# Patient Record
Sex: FEMALE | Race: WHITE
[De-identification: names, ages, dates, MRNs, and addresses within clinical notes are randomized per-mention and may not be internally consistent; named-entity substitution may affect disease eponyms.]

---

## 2022-12-13 ENCOUNTER — HOSPITAL ENCOUNTER (EMERGENCY)
Dept: HOSPITAL 4 - SED | Age: 77
Discharge: HOME | End: 2022-12-13
Payer: MEDICARE

## 2022-12-13 VITALS — SYSTOLIC BLOOD PRESSURE: 131 MMHG

## 2022-12-13 VITALS — WEIGHT: 140 LBS | HEIGHT: 64 IN | BODY MASS INDEX: 23.9 KG/M2

## 2022-12-13 VITALS — SYSTOLIC BLOOD PRESSURE: 140 MMHG

## 2022-12-13 DIAGNOSIS — E86.0: ICD-10-CM

## 2022-12-13 DIAGNOSIS — R19.7: ICD-10-CM

## 2022-12-13 DIAGNOSIS — R10.33: ICD-10-CM

## 2022-12-13 DIAGNOSIS — A08.4: Primary | ICD-10-CM

## 2022-12-13 DIAGNOSIS — R11.2: ICD-10-CM

## 2022-12-13 DIAGNOSIS — Z79.899: ICD-10-CM

## 2022-12-13 DIAGNOSIS — Z20.822: ICD-10-CM

## 2022-12-13 LAB
ALBUMIN SERPL BCP-MCNC: 2.6 G/DL (ref 3.4–4.8)
ALT SERPL W P-5'-P-CCNC: 41 U/L (ref 12–78)
ANION GAP SERPL CALCULATED.3IONS-SCNC: 17 MMOL/L (ref 5–15)
AST SERPL W P-5'-P-CCNC: 39 U/L (ref 10–37)
BASOPHILS NFR BLD MANUAL: 0 % (ref 0–2)
BILIRUB SERPL-MCNC: 1.1 MG/DL (ref 0–1)
BUN SERPL-MCNC: 62 MG/DL (ref 8–21)
CALCIUM SERPL-MCNC: 9.7 MG/DL (ref 8.4–11)
CHLORIDE SERPL-SCNC: 100 MMOL/L (ref 98–107)
CREAT SERPL-MCNC: 2.61 MG/DL (ref 0.55–1.3)
EOSINOPHIL NFR BLD MANUAL: 1 % (ref 0–7)
ERYTHROCYTE [DISTWIDTH] IN BLOOD BY AUTOMATED COUNT: 15 % (ref 9–15)
GFR SERPL CREATININE-BSD FRML MDRD: (no result) ML/MIN (ref 90–?)
GLUCOSE SERPL-MCNC: 178 MG/DL (ref 70–99)
HCT VFR BLD AUTO: 41.9 % (ref 36–48)
HGB BLD-MCNC: 13.9 G/DL (ref 12–16)
LIPASE SERPL-CCNC: 84 U/L (ref 73–393)
LYMPHOCYTES NFR BLD MANUAL: 6 % (ref 20–46)
MCH RBC QN AUTO: 30 PG (ref 27–31)
MCHC RBC AUTO-ENTMCNC: 33 % (ref 32–36)
MCV RBC AUTO: 90 FL (ref 79–98)
MONOCYTES # BLD MANUAL: 2 % (ref 0–11)
NEUTS BAND NFR BLD MANUAL: 11 % (ref 0–6)
NRBC BLD MANUAL-RTO: 1 /100WBC (ref 0–0)
PLATELET # BLD AUTO: 60 K/UL (ref 130–430)
RBC # BLD AUTO: 4.67 MIL/UL (ref 4.2–6.2)
WBC # BLD AUTO: 3 K/UL (ref 4.8–10.8)

## 2022-12-13 PROCEDURE — 83690 ASSAY OF LIPASE: CPT

## 2022-12-13 PROCEDURE — 96361 HYDRATE IV INFUSION ADD-ON: CPT

## 2022-12-13 PROCEDURE — 36415 COLL VENOUS BLD VENIPUNCTURE: CPT

## 2022-12-13 PROCEDURE — 85027 COMPLETE CBC AUTOMATED: CPT

## 2022-12-13 PROCEDURE — 85007 BL SMEAR W/DIFF WBC COUNT: CPT

## 2022-12-13 PROCEDURE — 99284 EMERGENCY DEPT VISIT MOD MDM: CPT

## 2022-12-13 PROCEDURE — 80053 COMPREHEN METABOLIC PANEL: CPT

## 2022-12-13 PROCEDURE — 87804 INFLUENZA ASSAY W/OPTIC: CPT

## 2022-12-13 PROCEDURE — 96374 THER/PROPH/DIAG INJ IV PUSH: CPT

## 2022-12-13 PROCEDURE — 96375 TX/PRO/DX INJ NEW DRUG ADDON: CPT

## 2022-12-13 PROCEDURE — 87426 SARSCOV CORONAVIRUS AG IA: CPT

## 2022-12-13 NOTE — NUR
PT IN ER [facility] C/O FEVER, NAUSEA, VOMITING AND STOMACH PAIN, PT .3] 
FEVER MD NOTIFIED. PT IS VSS NAD.

## 2022-12-13 NOTE — NUR
Patient given written and verbal discharge instructions and verbalizes 
understanding.  ER MD Lundberg discussed with patient the results and treatment 
provided. Patient in stable condition. ID arm band removed. IV catheter removed 
intact and dressing applied, no active bleeding.

Rx of Ibuprofen and Zofran sent to preferred pharmacy.  Patient educated on 
pain management and to follow up with PMD. Pain Scale 0/10.

Opportunity for questions provided and answered. Medication side effect fact 
sheet provided.

## 2022-12-13 NOTE — NUR
PT BIB BLS FROM HOME CC FLU LIKE SYMPTOMS. PT COMPLAINS OF FEVER, SORE THROAT, 
DIARRHEA NAUSEA, AND EPISODES OF VOMITING.